# Patient Record
Sex: MALE | Race: WHITE | NOT HISPANIC OR LATINO | Employment: STUDENT | URBAN - METROPOLITAN AREA
[De-identification: names, ages, dates, MRNs, and addresses within clinical notes are randomized per-mention and may not be internally consistent; named-entity substitution may affect disease eponyms.]

---

## 2019-01-24 ENCOUNTER — IMMUNIZATION (OUTPATIENT)
Dept: PHARMACY | Facility: CLINIC | Age: 29
End: 2019-01-24
Payer: COMMERCIAL

## 2020-04-21 DIAGNOSIS — Z01.84 ANTIBODY RESPONSE EXAMINATION: ICD-10-CM

## 2020-05-02 ENCOUNTER — HOSPITAL ENCOUNTER (EMERGENCY)
Facility: HOSPITAL | Age: 30
Discharge: HOME | End: 2020-05-02
Attending: PEDIATRICS
Payer: COMMERCIAL

## 2020-05-02 VITALS
DIASTOLIC BLOOD PRESSURE: 94 MMHG | HEART RATE: 83 BPM | SYSTOLIC BLOOD PRESSURE: 153 MMHG | WEIGHT: 155 LBS | RESPIRATION RATE: 16 BRPM | OXYGEN SATURATION: 100 % | TEMPERATURE: 99 F | HEIGHT: 66 IN | BODY MASS INDEX: 24.91 KG/M2

## 2020-05-02 DIAGNOSIS — S90.551A: Primary | ICD-10-CM

## 2020-05-02 PROCEDURE — 99283 EMERGENCY DEPT VISIT LOW MDM: CPT

## 2020-05-02 PROCEDURE — 27610 EXPLORE/TREAT ANKLE JOINT: CPT | Mod: RT

## 2020-05-02 PROCEDURE — 99283 EMERGENCY DEPT VISIT LOW MDM: CPT | Mod: ,,, | Performed by: PEDIATRICS

## 2020-05-02 PROCEDURE — 99283 PR EMERGENCY DEPT VISIT,LEVEL III: ICD-10-PCS | Mod: ,,, | Performed by: PEDIATRICS

## 2020-05-02 PROCEDURE — 25000003 PHARM REV CODE 250: Performed by: PEDIATRICS

## 2020-05-02 RX ORDER — LIDOCAINE HYDROCHLORIDE AND EPINEPHRINE 10; 10 MG/ML; UG/ML
10 INJECTION, SOLUTION INFILTRATION; PERINEURAL ONCE
Status: COMPLETED | OUTPATIENT
Start: 2020-05-02 | End: 2020-05-02

## 2020-05-02 RX ORDER — DOXYCYCLINE 100 MG/1
100 CAPSULE ORAL 2 TIMES DAILY
Qty: 20 CAPSULE | Refills: 0 | Status: SHIPPED | OUTPATIENT
Start: 2020-05-02 | End: 2020-05-12

## 2020-05-02 RX ORDER — BACITRACIN ZINC 500 UNIT/G
1 OINTMENT IN PACKET (EA) TOPICAL
Status: COMPLETED | OUTPATIENT
Start: 2020-05-02 | End: 2020-05-02

## 2020-05-02 RX ADMIN — LIDOCAINE HYDROCHLORIDE AND EPINEPHRINE 10 ML: 10; 10 INJECTION, SOLUTION INFILTRATION; PERINEURAL at 09:05

## 2020-05-02 RX ADMIN — BACITRACIN 1 EACH: 500 OINTMENT TOPICAL at 10:05

## 2020-05-03 NOTE — ED PROVIDER NOTES
Encounter Date: 5/2/2020       History     Chief Complaint   Patient presents with    Ankle Pain     Fishing hook to right medial ankle since 1600 today. No bleeding currently.      29-year-old male was deep sea fishing today for 2 none when he got a very large look impaled in his right ankle.  This occurred around 4:00 p.m..  Upon returning he cleaned his fish and then came to the emergency room.  He denies any numbness or tingling.  There is no bleeding.  The hook remains imbedded in his ankle.  He is otherwise well.  No fever, cough or cold symptoms, nausea, vomiting, or diarrhea.  No sore throat.    ILLNESS: none, ALLERGIES: none, SURGERIES: none, HOSPITALIZATIONS: none, MEDICATIONS: none, Immunizations: UTD, last tetanus in January of 2019.      The history is provided by the patient.     Review of patient's allergies indicates:  No Known Allergies  History reviewed. No pertinent past medical history.  History reviewed. No pertinent surgical history.  History reviewed. No pertinent family history.  Social History     Tobacco Use    Smoking status: Never Smoker    Smokeless tobacco: Never Used   Substance Use Topics    Alcohol use: Not on file    Drug use: Not on file     Review of Systems   Constitutional: Negative for fever.   HENT: Negative for congestion, rhinorrhea and sore throat.    Eyes: Negative for discharge.   Respiratory: Negative for cough.    Gastrointestinal: Negative for diarrhea, nausea and vomiting.   Genitourinary: Negative for decreased urine volume.   Musculoskeletal: Positive for gait problem.   Skin: Positive for wound. Negative for rash.   Allergic/Immunologic: Negative for immunocompromised state.   Hematological: Does not bruise/bleed easily.       Physical Exam     Initial Vitals [05/02/20 2058]   BP Pulse Resp Temp SpO2   (!) 166/82 83 16 98.8 °F (37.1 °C) 100 %      MAP       --         Physical Exam    Nursing note and vitals reviewed.  Constitutional: He appears well-developed  and well-nourished. No distress.   Pulmonary/Chest: No respiratory distress.   Musculoskeletal:        Feet:          ED Course   Foreign Body  Date/Time: 5/2/2020 9:37 PM  Performed by: Patel Hou MD  Authorized by: Patel Hou MD   Consent Done: Not Needed  Body area: skin  General location: lower extremity  Location details: right ankle  Anesthesia: local infiltration    Anesthesia:  Local Anesthetic: lidocaine 1% with epinephrine  Anesthetic total: 4 mL  Patient sedated: no  Patient restrained: no  Complexity: simple  1 objects recovered.  Objects recovered: [place]  Post-procedure assessment: foreign body removed  Patient tolerance: Patient tolerated the procedure well with no immediate complications  Comments: Ratchet pliers used to cut non barbed end of the hook off. hook gripped and pushed through creating 2nd puncture wound.  End of look grabbed and pulled through without complication.  No active bleeding after.  Puncture wounds irrigated with over 400 mL of normal saline.      Labs Reviewed - No data to display       Imaging Results    None          Medical Decision Making:   History:   I obtained history from: someone other than patient.  Old Medical Records: I decided to obtain old medical records.  Initial Assessment:   3-year-old male with large fish hook imbedded in his ankle  Differential Diagnosis:   Puncture wound  Vascular injury  Nerve injury  Tendon or ligament injury  ED Management:  Spoke is mostly superficial.  Site numbed with lidocaine and fishhook removed.  See procedure note for details.                                 Clinical Impression:       ICD-10-CM ICD-9-CM   1. Foreign body of skin of ankle, right, initial encounter S90.551A 916.6         Disposition:   Disposition: Discharged  Condition: Stable  [place] imbedded ankle.  Successfully removed.  No complications.  Doxycycline twice a day for 10 days.  Return for signs of infection     ED Disposition Condition     Discharge Good        ED Prescriptions     Medication Sig Dispense Start Date End Date Auth. Provider    doxycycline (VIBRAMYCIN) 100 MG Cap Take 1 capsule (100 mg total) by mouth 2 (two) times daily. for 10 days 20 capsule 5/2/2020 5/12/2020 Patel Hou MD        Follow-up Information     Follow up With Specialties Details Why Contact Info    Primary Doctor No  Schedule an appointment as soon as possible for a visit  As needed, If symptoms worsen                                      Patel Hou MD  05/02/20 [id number]

## 2020-05-03 NOTE — DISCHARGE INSTRUCTIONS
Soak wound in warm water for 20 min 2 to 3 times a day to help prevent infection.  Apply antibiotic ointment and bandage afterwards.

## 2020-05-03 NOTE — ED TRIAGE NOTES
Pt. Presents with a fishing hook to right, medial ankle. The pt. States that the accident happened at about 1600 this afternoon. The pt. States that he took 2 Aleve at about 1800 this evening for the pain.

## 2020-05-21 DIAGNOSIS — Z01.84 ANTIBODY RESPONSE EXAMINATION: ICD-10-CM

## 2020-06-20 DIAGNOSIS — Z01.84 ANTIBODY RESPONSE EXAMINATION: ICD-10-CM

## 2020-07-20 DIAGNOSIS — Z01.84 ANTIBODY RESPONSE EXAMINATION: ICD-10-CM

## 2020-08-19 DIAGNOSIS — Z01.84 ANTIBODY RESPONSE EXAMINATION: ICD-10-CM

## 2020-09-18 DIAGNOSIS — Z01.84 ANTIBODY RESPONSE EXAMINATION: ICD-10-CM

## 2020-10-18 DIAGNOSIS — Z01.84 ANTIBODY RESPONSE EXAMINATION: ICD-10-CM

## 2020-11-17 DIAGNOSIS — Z01.84 ANTIBODY RESPONSE EXAMINATION: ICD-10-CM

## 2021-01-19 ENCOUNTER — IMMUNIZATION (OUTPATIENT)
Dept: INTERNAL MEDICINE | Facility: CLINIC | Age: 31
End: 2021-01-19
Payer: COMMERCIAL

## 2021-01-19 DIAGNOSIS — Z23 NEED FOR VACCINATION: Primary | ICD-10-CM

## 2021-01-19 PROCEDURE — 91300 COVID-19, MRNA, LNP-S, PF, 30 MCG/0.3 ML DOSE VACCINE: CPT | Mod: PBBFAC | Performed by: INTERNAL MEDICINE

## 2021-02-09 ENCOUNTER — IMMUNIZATION (OUTPATIENT)
Dept: INTERNAL MEDICINE | Facility: CLINIC | Age: 31
End: 2021-02-09
Payer: COMMERCIAL

## 2021-02-09 DIAGNOSIS — Z23 NEED FOR VACCINATION: Primary | ICD-10-CM

## 2021-02-09 PROCEDURE — 91300 COVID-19, MRNA, LNP-S, PF, 30 MCG/0.3 ML DOSE VACCINE: CPT | Mod: PBBFAC | Performed by: INTERNAL MEDICINE

## 2021-02-09 PROCEDURE — 0002A COVID-19, MRNA, LNP-S, PF, 30 MCG/0.3 ML DOSE VACCINE: CPT | Mod: PBBFAC | Performed by: INTERNAL MEDICINE

## 2023-10-17 ENCOUNTER — LAB VISIT (OUTPATIENT)
Dept: LAB | Facility: HOSPITAL | Age: 33
End: 2023-10-17
Payer: COMMERCIAL

## 2023-10-17 ENCOUNTER — OFFICE VISIT (OUTPATIENT)
Dept: PRIMARY CARE CLINIC | Facility: CLINIC | Age: 33
End: 2023-10-17
Payer: COMMERCIAL

## 2023-10-17 VITALS
SYSTOLIC BLOOD PRESSURE: 130 MMHG | HEART RATE: 64 BPM | WEIGHT: 171.31 LBS | OXYGEN SATURATION: 99 % | TEMPERATURE: 97 F | HEIGHT: 66 IN | DIASTOLIC BLOOD PRESSURE: 82 MMHG | BODY MASS INDEX: 27.53 KG/M2

## 2023-10-17 DIAGNOSIS — Z11.59 ENCOUNTER FOR HCV SCREENING TEST FOR LOW RISK PATIENT: ICD-10-CM

## 2023-10-17 DIAGNOSIS — Z13.220 ENCOUNTER FOR LIPID SCREENING FOR CARDIOVASCULAR DISEASE: ICD-10-CM

## 2023-10-17 DIAGNOSIS — Z13.1 ENCOUNTER FOR SCREENING FOR DIABETES MELLITUS: ICD-10-CM

## 2023-10-17 DIAGNOSIS — Z13.29 THYROID DISORDER SCREENING: ICD-10-CM

## 2023-10-17 DIAGNOSIS — Z84.81 FAMILY HISTORY OF BRCA GENE POSITIVE: ICD-10-CM

## 2023-10-17 DIAGNOSIS — L40.9 PSORIASIS: ICD-10-CM

## 2023-10-17 DIAGNOSIS — Z11.4 ENCOUNTER FOR SCREENING FOR HIV: ICD-10-CM

## 2023-10-17 DIAGNOSIS — Z76.89 ENCOUNTER TO ESTABLISH CARE: ICD-10-CM

## 2023-10-17 DIAGNOSIS — Z13.6 ENCOUNTER FOR LIPID SCREENING FOR CARDIOVASCULAR DISEASE: ICD-10-CM

## 2023-10-17 DIAGNOSIS — Z00.00 ANNUAL PHYSICAL EXAM: ICD-10-CM

## 2023-10-17 DIAGNOSIS — Z00.00 ANNUAL PHYSICAL EXAM: Primary | ICD-10-CM

## 2023-10-17 LAB
ALBUMIN SERPL BCP-MCNC: 4.5 G/DL (ref 3.5–5.2)
ALP SERPL-CCNC: 54 U/L (ref 55–135)
ALT SERPL W/O P-5'-P-CCNC: 28 U/L (ref 10–44)
ANION GAP SERPL CALC-SCNC: 10 MMOL/L (ref 8–16)
AST SERPL-CCNC: 27 U/L (ref 10–40)
BASOPHILS # BLD AUTO: 0.07 K/UL (ref 0–0.2)
BASOPHILS NFR BLD: 1.2 % (ref 0–1.9)
BILIRUB SERPL-MCNC: 0.8 MG/DL (ref 0.1–1)
BUN SERPL-MCNC: 12 MG/DL (ref 6–20)
CALCIUM SERPL-MCNC: 10.3 MG/DL (ref 8.7–10.5)
CHLORIDE SERPL-SCNC: 104 MMOL/L (ref 95–110)
CHOLEST SERPL-MCNC: 229 MG/DL (ref 120–199)
CHOLEST/HDLC SERPL: 4.6 {RATIO} (ref 2–5)
CO2 SERPL-SCNC: 27 MMOL/L (ref 23–29)
CREAT SERPL-MCNC: 1.1 MG/DL (ref 0.5–1.4)
DIFFERENTIAL METHOD: NORMAL
EOSINOPHIL # BLD AUTO: 0.4 K/UL (ref 0–0.5)
EOSINOPHIL NFR BLD: 6.7 % (ref 0–8)
ERYTHROCYTE [DISTWIDTH] IN BLOOD BY AUTOMATED COUNT: 12.6 % (ref 11.5–14.5)
EST. GFR  (NO RACE VARIABLE): >60 ML/MIN/1.73 M^2
ESTIMATED AVG GLUCOSE: 94 MG/DL (ref 68–131)
GLUCOSE SERPL-MCNC: 84 MG/DL (ref 70–110)
HBA1C MFR BLD: 4.9 % (ref 4–5.6)
HCT VFR BLD AUTO: 49 % (ref 40–54)
HCV AB SERPL QL IA: NORMAL
HDLC SERPL-MCNC: 50 MG/DL (ref 40–75)
HDLC SERPL: 21.8 % (ref 20–50)
HGB BLD-MCNC: 16.4 G/DL (ref 14–18)
HIV 1+2 AB+HIV1 P24 AG SERPL QL IA: NORMAL
IMM GRANULOCYTES # BLD AUTO: 0.02 K/UL (ref 0–0.04)
IMM GRANULOCYTES NFR BLD AUTO: 0.4 % (ref 0–0.5)
LDLC SERPL CALC-MCNC: 160.2 MG/DL (ref 63–159)
LYMPHOCYTES # BLD AUTO: 2.1 K/UL (ref 1–4.8)
LYMPHOCYTES NFR BLD: 37.7 % (ref 18–48)
MCH RBC QN AUTO: 29.8 PG (ref 27–31)
MCHC RBC AUTO-ENTMCNC: 33.5 G/DL (ref 32–36)
MCV RBC AUTO: 89 FL (ref 82–98)
MONOCYTES # BLD AUTO: 0.5 K/UL (ref 0.3–1)
MONOCYTES NFR BLD: 9.2 % (ref 4–15)
NEUTROPHILS # BLD AUTO: 2.5 K/UL (ref 1.8–7.7)
NEUTROPHILS NFR BLD: 44.8 % (ref 38–73)
NONHDLC SERPL-MCNC: 179 MG/DL
NRBC BLD-RTO: 0 /100 WBC
PLATELET # BLD AUTO: 304 K/UL (ref 150–450)
PMV BLD AUTO: 9.9 FL (ref 9.2–12.9)
POTASSIUM SERPL-SCNC: 4.3 MMOL/L (ref 3.5–5.1)
PROT SERPL-MCNC: 7.4 G/DL (ref 6–8.4)
RBC # BLD AUTO: 5.51 M/UL (ref 4.6–6.2)
SODIUM SERPL-SCNC: 141 MMOL/L (ref 136–145)
TRIGL SERPL-MCNC: 94 MG/DL (ref 30–150)
TSH SERPL DL<=0.005 MIU/L-ACNC: 1.13 UIU/ML (ref 0.4–4)
WBC # BLD AUTO: 5.63 K/UL (ref 3.9–12.7)

## 2023-10-17 PROCEDURE — 3008F PR BODY MASS INDEX (BMI) DOCUMENTED: ICD-10-PCS | Mod: CPTII,S$GLB,, | Performed by: FAMILY MEDICINE

## 2023-10-17 PROCEDURE — 3079F DIAST BP 80-89 MM HG: CPT | Mod: CPTII,S$GLB,, | Performed by: FAMILY MEDICINE

## 2023-10-17 PROCEDURE — 36415 COLL VENOUS BLD VENIPUNCTURE: CPT | Mod: PN | Performed by: FAMILY MEDICINE

## 2023-10-17 PROCEDURE — 87389 HIV-1 AG W/HIV-1&-2 AB AG IA: CPT | Performed by: FAMILY MEDICINE

## 2023-10-17 PROCEDURE — 1159F PR MEDICATION LIST DOCUMENTED IN MEDICAL RECORD: ICD-10-PCS | Mod: CPTII,S$GLB,, | Performed by: FAMILY MEDICINE

## 2023-10-17 PROCEDURE — 80053 COMPREHEN METABOLIC PANEL: CPT | Performed by: FAMILY MEDICINE

## 2023-10-17 PROCEDURE — 3079F PR MOST RECENT DIASTOLIC BLOOD PRESSURE 80-89 MM HG: ICD-10-PCS | Mod: CPTII,S$GLB,, | Performed by: FAMILY MEDICINE

## 2023-10-17 PROCEDURE — 3075F PR MOST RECENT SYSTOLIC BLOOD PRESS GE 130-139MM HG: ICD-10-PCS | Mod: CPTII,S$GLB,, | Performed by: FAMILY MEDICINE

## 2023-10-17 PROCEDURE — 83036 HEMOGLOBIN GLYCOSYLATED A1C: CPT | Performed by: FAMILY MEDICINE

## 2023-10-17 PROCEDURE — 80061 LIPID PANEL: CPT | Performed by: FAMILY MEDICINE

## 2023-10-17 PROCEDURE — 99385 PR PREVENTIVE VISIT,NEW,18-39: ICD-10-PCS | Mod: S$GLB,,, | Performed by: FAMILY MEDICINE

## 2023-10-17 PROCEDURE — 99385 PREV VISIT NEW AGE 18-39: CPT | Mod: S$GLB,,, | Performed by: FAMILY MEDICINE

## 2023-10-17 PROCEDURE — 3075F SYST BP GE 130 - 139MM HG: CPT | Mod: CPTII,S$GLB,, | Performed by: FAMILY MEDICINE

## 2023-10-17 PROCEDURE — 1159F MED LIST DOCD IN RCRD: CPT | Mod: CPTII,S$GLB,, | Performed by: FAMILY MEDICINE

## 2023-10-17 PROCEDURE — 99999 PR PBB SHADOW E&M-EST. PATIENT-LVL IV: CPT | Mod: PBBFAC,,, | Performed by: FAMILY MEDICINE

## 2023-10-17 PROCEDURE — 99999 PR PBB SHADOW E&M-EST. PATIENT-LVL IV: ICD-10-PCS | Mod: PBBFAC,,, | Performed by: FAMILY MEDICINE

## 2023-10-17 PROCEDURE — 86803 HEPATITIS C AB TEST: CPT | Performed by: FAMILY MEDICINE

## 2023-10-17 PROCEDURE — 3008F BODY MASS INDEX DOCD: CPT | Mod: CPTII,S$GLB,, | Performed by: FAMILY MEDICINE

## 2023-10-17 PROCEDURE — 85025 COMPLETE CBC W/AUTO DIFF WBC: CPT | Performed by: FAMILY MEDICINE

## 2023-10-17 PROCEDURE — 84443 ASSAY THYROID STIM HORMONE: CPT | Performed by: FAMILY MEDICINE

## 2023-10-17 RX ORDER — RUXOLITINIB 15 MG/G
CREAM TOPICAL
COMMUNITY
Start: 2023-07-14

## 2023-10-17 NOTE — PATIENT INSTRUCTIONS
Physically, everything looks really good today.      Let us get some screening blood work done today.  Results will be posted to [facility] as soon as they are available.      With your family history of the gene mutation, let us have you visit with our Genetics Department.  They will discuss screening options.  Referral placed today.  If you do not hear from them in the next week or so, please let me know.  I will see what I can do to facilitate the appointment.      Continue to eat a healthy diet.  Be careful with portion sizes.  Includes lots of fresh fruits, vegetables, whole grains, lean proteins.  See info below.    Keep hydrated.  Be sure to drink at least 8-10, 8 oz, glasses of water every day.    Stay active.  Try to do some sort of physical activity every day.  Nothing outrageous, just try walking for 10-15 minutes each day.

## 2023-10-17 NOTE — PROGRESS NOTES
[facility]       [address] [place] [address]      Phone: [phone]      Fax: [phone]    Benedicto Foster MD    Office Visit  10/17/2023    Establish Care      33-year-old gentleman presents today to establish care, checkup.      Patient states he moved from [place].  Needs to get established with someone here.    Overall, feels okay.  No acute complaints.  No chest pain, shortness a breath.  No fever, chills, body aches.  No coughing, sneezing, URI type symptoms.  Appetite normal.  Bowel movements normal.  No urinary issues.      Has history of psoriasis on his hands.  Sees Dermatology regularly.  They started him on medication,opzelura.  So far, seems to be working.      States that a gene mutation runs in the family.  Recently, his maternal grandmother passed away suddenly from ovarian cancer.  As a result, his mom got tested for the BRCA gene, she was positive.  She had a prophylactic double mastectomy.  His sister was tested, as well, but she came back negative.  He would like to discuss getting tested himself.      PMH:  Psoriasis   PSH: None reported  F MH:  CAD/CABG.  HTN.  Ovarian cancer.  BRCA gene mutation.  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    T: Denies  A:  Socially/occasionally   D: Denies    Exercise rides exercise bike, runs, a couple times per week.      Dermatology:  Mikki Sutherland at [facility] ([facility])            Immunizations:  Immunization History   Administered Date(s) Administered    COVID-19, MRNA, LN-S, PF (Pfizer) (Purple Cap) 2021, 2021, 2021    DTaP 1991, 1992, 1992, 1993, 10/03/1995    HIB 1991, 1992, 1993    HPV Quadrivalent 2013    Hepatitis A, Pediatric/Adolescent, 2 Dose 2007    Hepatitis B, Pediatric/Adolescent 08/10/2004, 09/10/2004, 02/10/2005    IPV 1991,  "03/03/1992, 03/03/1993, 09/03/1994, 10/07/1995    Influenza - Quadrivalent - PF *Preferred* (6 months and older) 10/05/2016, 10/12/2017, 10/04/2018, 10/02/2019, 10/15/2020    Influenza - Trivalent (ADULT) 10/07/2014    MMR 01/03/1993, 07/03/1995, 07/31/2012    Meningococcal Conjugate 07/05/2012    Meningococcal Conjugate (MCV4P) 08/14/2007, 07/31/2012    PPD Test 06/18/2012, 07/23/2013    Td (ADULT) 08/10/2004    Tdap 07/15/2014, 01/24/2019    Varicella 09/03/2001, 08/14/2007, 07/05/2012, 08/20/2012    meningococcal Conjugate (MCV4O) 07/05/2012       Care Teams:  Patient Care Team:  Benedicto Foster MD as PCP - General (Family Medicine)    Medical History:  History reviewed. No pertinent past medical history.    Social History:  Social History     Socioeconomic History    Marital status: Single   Tobacco Use    Smoking status: Never    Smokeless tobacco: Never       Alcohol History:  Social History     Substance and Sexual Activity   Alcohol Use None       Tobacco History:  Social History     Tobacco Use   Smoking Status Never   Smokeless Tobacco Never       Family History:  Family History   Problem Relation Age of Onset    Hypertension Father        Surgical History:  History reviewed. No pertinent surgical history.    Allergies:  Review of patient's allergies indicates:   Allergen Reactions    Allergen ext-venom-honey bee Swelling    Wasp venom Hives, Itching and Swelling       Medications:    Current Outpatient Medications:     OPZELURA 1.5 % Crea, Apply topically., Disp: , Rfl:     Health Maintenance:  Health Maintenance   Topic Date Due    Hepatitis C Screening  Never done    TETANUS VACCINE  01/24/2029    Lipid Panel  Completed       Screening Questions  1.  Do you smoke? No  2.  In the past month have you been bothered by feeling "down", depressed or hopeless? No  3.  In the past month, have you experienced a loss of interest or pleasure in doing things? No  4.  In the past 3 months, on any single " "occasion have you had 5 or more drinks containing alcohol? No  5.  Regarding your use of alcohol, have you ever felt you should cut down on your drinking? No  6.  Are you sexually active? Yes  7   Do you exercise?  three times a week    Counseling  The patient was counseled regarding diet and exercise, motor vehicle safety, sun exposure, and use of vitamins and supplements.  The patient was counseled regarding Living Will/Durable Power-Of-.  The patient was given information regarding the dangers of smoking and the overuse of alcohol.    Review of Systems   Constitutional:  Negative for activity change, appetite change, chills and fever.   HENT:  Negative for congestion, postnasal drip, rhinorrhea, sore throat and trouble swallowing.    Respiratory:  Negative for cough and shortness of breath.    Cardiovascular:  Negative for chest pain and palpitations.   Gastrointestinal:  Negative for abdominal pain, constipation, diarrhea, nausea and vomiting.   Genitourinary:  Negative for difficulty urinating.   Musculoskeletal:  Negative for arthralgias and myalgias.   Skin:  Negative for color change and rash.   Neurological:  Negative for headaches.   All other systems reviewed and are negative.       Objective   Vitals:    10/17/23 1114   BP: 130/82   Pulse: 64   Temp: 97.4 °F (36.3 °C)   SpO2: 99%   Weight: 77.7 kg (171 lb 4.8 oz)   Height: 5' 6" (1.676 m)     Physical Exam  Vitals and nursing note reviewed.   Constitutional:       General: He is not in acute distress.     Appearance: Normal appearance.   HENT:      Head: Normocephalic and atraumatic.      Right Ear: Tympanic membrane, ear canal and external ear normal.      Left Ear: Tympanic membrane, ear canal and external ear normal.      Nose: Nose normal. No congestion or rhinorrhea.      Mouth/Throat:      Mouth: Mucous membranes are moist.      Pharynx: Oropharynx is clear. No oropharyngeal exudate or posterior oropharyngeal erythema.   Eyes:      " Extraocular Movements: Extraocular movements intact.      Conjunctiva/sclera: Conjunctivae normal.      Pupils: Pupils are equal, round, and reactive to light.   Cardiovascular:      Rate and Rhythm: Normal rate and regular rhythm.   Pulmonary:      Effort: Pulmonary effort is normal.      Breath sounds: No wheezing, rhonchi or rales.   Musculoskeletal:         General: Normal range of motion.      Cervical back: Normal range of motion.   Lymphadenopathy:      Cervical: No cervical adenopathy.   Skin:     General: Skin is warm and dry.   Neurological:      General: No focal deficit present.      Mental Status: He is alert.          No results found for this or any previous visit (from the past 4368 hour(s)).    Alexei Aguiar was seen today for establish care.    Diagnoses and all orders for this visit:    Annual physical exam  -     CBC Auto Differential; Future  -     Comprehensive Metabolic Panel; Future  -     TSH; Future  -     Lipid Panel; Future  -     Hemoglobin A1C; Future  -     HIV 1/2 Ag/Ab (4th Gen); Future  -     Hepatitis C Antibody; Future    Encounter to establish care    Psoriasis    Family history of BRCA gene positive  -     Ambulatory referral/consult to Genetics; Future    Thyroid disorder screening  -     TSH; Future    Encounter for lipid screening for cardiovascular disease  -     Lipid Panel; Future    Encounter for screening for diabetes mellitus  -     Hemoglobin A1C; Future    Encounter for screening for HIV  -     HIV 1/2 Ag/Ab (4th Gen); Future    Encounter for HCV screening test for low risk patient  -     Hepatitis C Antibody; Future    Screening labs, as above.      Referral to Genetics to discuss BRCA screening.    Follow-up:  Follow up in about 1 year (around 10/17/2024) for annual exam.    Signed by:  Benedicto Foster MD

## 2023-10-21 ENCOUNTER — PATIENT MESSAGE (OUTPATIENT)
Dept: PRIMARY CARE CLINIC | Facility: CLINIC | Age: 33
End: 2023-10-21
Payer: COMMERCIAL

## 2023-10-21 DIAGNOSIS — E78.01 FAMILIAL HYPERCHOLESTEROLEMIA: Primary | ICD-10-CM

## 2023-10-21 NOTE — PROGRESS NOTES
Overall, blood work looks pretty good.      Blood counts were all normal.  Electrolytes, kidney function, liver function, and thyroid function are fine.  You were negative for HIV and hepatitis-C.  A1c was in the normal range, so no signs of diabetes.    Cholesterol was okay.  Total cholesterol was 229.  Ideally, we would like this under 200.  LDL, or bad cholesterol, was also elevated at 160.  We really like this closer to 100.  That said, triglycerides were good at 94.  Anything under 150 is normal.  Also, HDL, or good cholesterol, was fine at 50.  Anything over 40 is normal.    At your age, I really do not want to put you on cholesterol medicine.  Really try to focus on diet and exercise.  Be careful with portions sizes.  Includes lots of fresh vegetables, whole grains, lean proteins.  Drink lots of water every day.  Stay active.  Do some sort of physical activity every day.  If these numbers continue to rise, we may have to consider some cholesterol medicine to help lower your risk of developing heart disease.  Let us try diet and exercise 1st.

## 2023-10-23 RX ORDER — ROSUVASTATIN CALCIUM 5 MG/1
5 TABLET, COATED ORAL DAILY
Qty: 90 TABLET | Refills: 3 | Status: SHIPPED | OUTPATIENT
Start: 2023-10-23 | End: 2024-10-22

## 2023-10-30 ENCOUNTER — PATIENT MESSAGE (OUTPATIENT)
Dept: GENETICS | Facility: CLINIC | Age: 33
End: 2023-10-30
Payer: COMMERCIAL

## 2023-11-08 NOTE — TELEPHONE ENCOUNTER
Spoke with Mr. Fontenotente via phone and scheduled his New Patient appointment with Josephine Martinez MS for 11.28.23 as requested.

## 2023-11-27 NOTE — PROGRESS NOTES
"  Cancer Genetics  Hereditary/High Risk Clinic  Department of Hematology and Oncology  [facility]        Date of Service:  2023  Provider:  Josephine Sanford MS, [facility]    Patient Information  Name:  Cosme Thompson  :  1990  MRN:  [id number]        Referring Provider: Benedicto Foster MD     The chief complaint leading to consultation is: as below.  Visit type: in-person.  Face-to-face time with patient: approximately 30 minutes.  Approximately 70 minutes in total were spent on this encounter, which includes face-to-face time and non-face-to-face time preparing to see the patient (e.g., review of tests), obtaining and/or reviewing separately obtained history, documenting clinical information in the electronic or other health record, independently interpreting results (not   reported) and communicating results to the patient/family/caregiver, or care coordination (not separately reported).      SUBJECTIVE:      Chief Complaint: Genetic Counseling    History of Present Illness (HPI):  Cosme Thompson ("Cosme"), 33 y.o., assigned male sex at birth is new to the [facility] and to me.  He was referred by  Primary Care  for genetic cancer risk assessment given his family history of BRCA2 mutation. He has no personal history of cancer.     Focused Medical History:   Germline cancer-genetic testing:  No  Cancer:  No  Colonoscopy: No  Other benign tumor:  Yes  Precancerous mole on back (2023)  Pancreatitis:  No  Pancreatic cyst:  No    Focused Surgical History  N/A    Ancestry:  Ashkenazi Anglican ancestry:  No  Paternal: /Burmese  Maternal: Sarika, Kiswahili    Focused Family History:  Consanguinity in ancestors:  No  Germline cancer-genetic testing in blood relatives:  Yes - Mother and Sister   Mother - [employer] Hereditary Cancer Test (2023)  BRCA2 [id number] ([id number]*) - PATHOGENIC  Sister - BRCA2 negative  Cancer in family:  Yes; there are no known blood " relatives affected with cancer other than those mentioned in the pedigree below    Family Cancer Pedigree:         Review of Systems:  See HPI.        SUBJECTIVE:   Records Reviewed  Medical History  Problem List  Any pertinent, available Procedures and Pathology reports in both [facility] and [facility]    COUNSELING   Causes of cancer  Germline cancer genetic testing is the testing of genes associated with cancer, known as cancer susceptibility genes.  Just as these genes are inherited from parents, mutations in these genes can be inherited, as well.  A mutation in a cancer susceptibility gene adversely affects the gene's ability to prevent cancer; therefore, carriers of cancer susceptibility gene mutations may be at increased risk for certain cancers.     Only 5-10% cancers are caused by a cancer susceptibility gene mutation, meaning the cancer is genetic/hereditary; rather, most cancers are sporadic.  Causes of sporadic cancers may include environmental risk factors, lifestyle risk factors, and non-modifiable risk factors.  It is important to note that members of a family often share not only their genetics but also risk factors including environmental and lifestyle risk factors, so cancers can be familial.    Mutations in BRCA1 and BRCA2 are associated with an increased risk for breast and ovarian cancer, in addition to male breast cancer, pancreatic, melanoma, and prostate cancer. Mutations in other genes may increase the risk of breast and prostate cancer, in addition to other cancers.     Potential results of genetic testing, and their implications  Potential results of genetic testing include positive, negative, and variant of unknown significance (VUS).    A positive result indicates the presence of at least one clinically significant mutation, and the patient's associated cancer risks vary depending upon the cancer susceptibility gene(s) in which there is/are a mutation(s).  With a  positive result, in some cases, depending upon the specific result and the patient's clinical history, modified risk management may be recommended, including measures for risk reduction and/or surveillance; however, modified management is not always an option.    A negative result indicates that no clinically significant mutations were identified in the gene(s) tested.    A VUS indicates that there is not presently enough data for the laboratory to make a determination as to whether the variant is clinically significant.  VUSs are not typically acted upon clinically.       The ability to interpret the meaning of a negative genetic testing result in genes associated with cancer with which the patient has not personally been affected, when done prior to testing the appropriate affected relative(s), is significantly limited.  A negative result in the patient does not indicate that she cannot develop cancer, and, in fact, the patient may even be at increased risk for cancer based on shared risk factors with affected relatives.  The most informative candidates for initial genetic testing in a family are those who have been affected with cancer.     Modified management may also be recommended, even with a result of no or unknown significance, based upon risk assessment that incorporates the family history.      Genetic mutation inheritance  If  Cosme tests positive for a mutation, her first-degree relatives would each have a 50% chance of having the same mutation, and other, more distantly related blood-relatives would also be at risk of having the same mutation.       Genetic discrimination  The Genetic Information Nondiscrimination Act (KAVYA) is U[place]S. federal legislation that provides some protections against use of an individual's genetic information by their health insurer and by their employer.  Title I of KAVYA prohibits most health insurers from utilizing an individual's genetic information to make decisions  regarding insurance eligibility or premium charges.  Title II of KAVYA prohibits covered entities, including employers, from requesting the genetic information of employees and applicants.  KAVYA does not protect individuals from genetic discrimination toward health insurance obtained through a job with the  or through the Federal Employees Health Benefits Plan; from genetic discrimination by employers with fewer than 15 employees or if employed by the ; or from genetic discrimination by any other type of policies/entities, including but not limited to life insurance, disability insurance, long-term care insurance,  benefits, and [facility] benefits.     Genetic testing logistics  An outside laboratory would perform the testing after a blood sample is collected at [facility] or a saliva sample is collected by the patient at home.  With genetic testing, there is a potential for the patient to incur out-of-pocket costs.  Results can take 2-3 weeks.  Post-test genetic counseling can be conducted once the genetic testing results are available.     Assessment/Plan  Based on the information provided by  Cosme, he meets current NCCN criteria for genetic testing of hereditary breast and ovarian cancer given the family history of a BRCA2 mutation identified in his mother.     Offered germline cancer-genetic testing at this time versus deferring testing at this time or declining testing altogether.  Various test panel options were discussed. Cosme decided to proceed with genetic testing through the 2-gene BRCA1/BRCA2 Core Panel and the 70-gene Multi-Cancer Panel (AIP, ALK, APC, VENKATA, AXIN2, BAP1, BARD1, BLM, BMPR1A, BRCA1, BRCA2, BRIP1, CDC73, CDH1, CDK4, CDKN1B, CDKN2A, CHEK2, CTNNA1, DICER1, EGFR, EPCAM, FH, FLCN, GREM1, HOXB13, KIT, LZTR1, MAX, MBD4, MEN1, MET, MITF, MLH1, MSH2, MSH3, MSH6, MUTYH, NF1, NF2, NTHL1, PALB2, PDGFRA, PMS2, POLD1, POLE, POT1, VTZSC2V, PTCH1, PTEN, RAD51C, RAD51D,  RB1, RET, SDHA, SDHAF2, SDHB, SDHC, SDHD, SMAD4, SMARCA4, SMARCB1, SMARCE1, STK11, SUFU, NSUS426, TP53, TSC1, TSC2, VHL), with note to look at his mother's mutation: BRCA2 [id number] (p.[id number]*).  Cosme has provided his informed consent to proceed. A blood sample was collected today.     Questions were encouraged and answered to the patient's satisfaction, and he verbalized understanding of information and agreement with the plan.         Signed,    Josephine Sanford MS, [facility]  Certified Genetic Counselor, Hereditary and High-Risk Clinic  Hematology/Oncology, [facility]    11/28/2023

## 2023-11-28 ENCOUNTER — LAB VISIT (OUTPATIENT)
Dept: LAB | Facility: HOSPITAL | Age: 33
End: 2023-11-28
Attending: INTERNAL MEDICINE
Payer: COMMERCIAL

## 2023-11-28 ENCOUNTER — OFFICE VISIT (OUTPATIENT)
Dept: GENETICS | Facility: CLINIC | Age: 33
End: 2023-11-28
Payer: COMMERCIAL

## 2023-11-28 DIAGNOSIS — Z80.41 FAMILY HISTORY OF OVARIAN CANCER: ICD-10-CM

## 2023-11-28 DIAGNOSIS — Z84.81 FAMILY HISTORY OF BRCA GENE POSITIVE: ICD-10-CM

## 2023-11-28 DIAGNOSIS — Z80.41 FAMILY HISTORY OF OVARIAN CANCER: Primary | ICD-10-CM

## 2023-11-28 PROCEDURE — 36415 COLL VENOUS BLD VENIPUNCTURE: CPT | Performed by: INTERNAL MEDICINE

## 2023-11-28 PROCEDURE — 96040 PR GENETIC COUNSELING, EACH 30 MIN: CPT | Mod: S$GLB,,, | Performed by: INTERNAL MEDICINE

## 2023-11-28 PROCEDURE — 99999 PR PBB SHADOW E&M-EST. PATIENT-LVL I: ICD-10-PCS | Mod: PBBFAC,,,

## 2023-11-28 PROCEDURE — 99499 NO LOS: ICD-10-PCS | Mod: S$GLB,,, | Performed by: INTERNAL MEDICINE

## 2023-11-28 PROCEDURE — 96040 PR GENETIC COUNSELING, EACH 30 MIN: ICD-10-PCS | Mod: S$GLB,,, | Performed by: INTERNAL MEDICINE

## 2023-11-28 PROCEDURE — 99999 PR PBB SHADOW E&M-EST. PATIENT-LVL I: CPT | Mod: PBBFAC,,,

## 2023-11-28 PROCEDURE — 99499 UNLISTED E&M SERVICE: CPT | Mod: S$GLB,,, | Performed by: INTERNAL MEDICINE

## 2024-01-03 ENCOUNTER — TELEPHONE (OUTPATIENT)
Dept: GENETICS | Facility: CLINIC | Age: 34
End: 2024-01-03
Payer: COMMERCIAL

## 2024-01-03 NOTE — TELEPHONE ENCOUNTER
Contacted Mr. Thompson to discuss his positive genetic test results: BRCA2 mutation. This result means that he has inherited his mother's BRCA2 mutation. There were no other mutations identified in the other 69 genes. Explained that he has an increased risk for male breast, prostate, pancreatic cancer and melanoma. Also discussed that his future children have a 50% risk for this mutation, in addition to his other relatives who have an increased risk. A follow-up appointment will be scheduled to further discuss the results. He was encouraged to reach out if he has any questions. Results were released to him in the [facility] portal and will be uploaded into his medical records.

## 2024-01-17 ENCOUNTER — OFFICE VISIT (OUTPATIENT)
Dept: GENETICS | Facility: CLINIC | Age: 34
End: 2024-01-17
Payer: COMMERCIAL

## 2024-01-17 DIAGNOSIS — Z15.01 BRCA2 GENE MUTATION POSITIVE IN MALE: Primary | ICD-10-CM

## 2024-01-17 DIAGNOSIS — Z15.09 BRCA2 GENE MUTATION POSITIVE IN MALE: Primary | ICD-10-CM

## 2024-01-17 DIAGNOSIS — Z15.03 BRCA2 GENE MUTATION POSITIVE IN MALE: Primary | ICD-10-CM

## 2024-01-17 PROCEDURE — 99499 UNLISTED E&M SERVICE: CPT | Mod: 95,,, | Performed by: INTERNAL MEDICINE

## 2024-01-17 NOTE — PROGRESS NOTES
"  Cancer Genetics  Hereditary/High Risk Clinic  Department of Hematology and Oncology  [facility]        Date of Service:  2024  Provider:  Josephine Sanford MS, [facility]    Patient Information  Name:  Cosme Thompson  :  1990  MRN:  [id number]        Referring Provider: Benedicto Foster MD      Televisit Information  The patient location is: [place], [place].  The chief complaint leading to consultation is: as below.  Visit type: audiovisual.  Face-to-face time with patient: approximately 10 minutes.  Approximately 105 minutes in total were spent on this encounter, which includes face-to-face time and non-face-to-face time preparing to see the patient (e.g., review of tests), obtaining and/or reviewing separately obtained history, documenting clinical information in the electronic or other health record, independently interpreting results (not separately reported) and communicating results to the patient/family/caregiver, or care coordination (not separately reported).  Each patient to whom he or she provides medical services by telemedicine is:  (1) informed of the relationship between the physician and patient and the respective role of any other health care provider with respect to management of the patient; and (2) notified that he or she may decline to receive medical services by telemedicine and may withdraw from such care at any time.      SUBJECTIVE:      Chief Complaint: Genetic Counseling    History of Present Illness (HPI):  Cosme Thompson ("Cosme"), 33 y.o., assigned male sex at birth is returning for post-test genetic counseling.  He initially presented on 2023 after being referred by  Primary Care  for genetic cancer risk assessment given his family history of a BRCA2 mutation. He underwent genetic testing, which revealed a BRCA2 mutation. We met today to discuss the results.    Focused Medical History:   Germline cancer-genetic testing:  Yes - [facility]   BRCA2 c.[id number] " (p.[id number]*) - [facility]  Cancer:  No  Colonoscopy: No  Other benign tumor:  Yes  Precancerous mole on back (02/2023)  Pancreatitis:  No  Pancreatic cyst:  No     Focused Surgical History  N/A     Ancestry:  Ashkenazi Evangelical ancestry:  No  Paternal: /Angolan  Maternal: Sarika, Lao     Focused Family History:  Consanguinity in ancestors:  No  Germline cancer-genetic testing in blood relatives:  Yes - Mother and Sister   Mother - [facility] Hereditary Cancer Test (05/2023)  BRCA2 [id number] (p.[id number]*) - PATHOGENIC  Sister - BRCA2 negative  Cancer in family:  Yes; other than those mentioned in the pedigree below  Paternal grandfather, paternal great-uncle - prostate cancer    Family Cancer Pedigree:           Genetic Testing Report:          Review of Systems:  See HPI.      SUBJECTIVE:   Records Reviewed  Medical History  Problem List  Any pertinent, available Procedures and Pathology reports in both [facility] and [facility]    COUNSELING   Genetic Testing  Due to his family history of a BRCA2 mutation, he underwent genetic testing in November 2023. The Multi-Cancer Panel was ordered, which investigated the following 70 genes: AIP, ALK, APC, VENKATA, AXIN2, BAP1, BARD1, BLM, BMPR1A, BRCA1, BRCA2, BRIP1, CDC73, CDH1, CDK4, CDKN1B, CDKN2A, CHEK2, CTNNA1, DICER1, EGFR, EPCAM, FH, FLCN, GREM1, HOXB13, KIT, LZTR1, MAX, MBD4, MEN1, MET, MITF, MLH1, MSH2, MSH3, MSH6, MUTYH, NF1, NF2, NTHL1, PALB2, PDGFRA, PMS2, POLD1, POLE, POT1, KLCTA4Q, PTCH1, PTEN, RAD51C, RAD51D, RB1, RET, SDHA, SDHAF2, SDHB, SDHC, SDHD, SMAD4, SMARCA4, SMARCB1, SMARCE1, STK11, SUFU, UEKN532, TP53, TSC1, TSC2, VHL.     Results are POSITIVE for a heterozygous germline pathogenic mutation in the BRCA2 gene c.[id number] ([id number]*). This results means that Cosme inherited the familial BRCA2 mutation from his mother. No other pathogenic mutations were identified in the other 69 genes investigated.      BRCA2 Cancer Risks  and Guidelines  Pathogenic mutations in BRCA2 gene are associated with hereditary breast and ovarian cancer syndrome (HBOC). HBOC is caused by a mutation in one of the following 2 genes: BRCA1 and BRCA2. HBOC is the most common cause for hereditary breast and ovarian cancer. Individuals who have a mutation in one of the HBOC genes are at risk for multiple cancers. Outlined below are the associated cancer risks and management guidelines for individuals with a BRCA2 mutation.     Male-Specific Cancer Risks: There is an increased risk for male breast cancer associated with a BRCA2 gene mutation. The lifetime risk is estimated to be 7-8% (compared to the general male population risk of 0.1%). Men are recommended to practice self-breast exams regularly and have a clinical breast exam every 12 months beginning at age 35. Also, men are recommended to begin mammogram screening at age 50, or 10 years prior than the youngest male breast cancer diagnosis in the family. Men with a BRCA2 mutation are also at an increased risk for prostate cancer (lifetime risk of 19-61%) and are recommended to have annual prostate cancer screening beginning at age 40. Prostate cancer screening involves both a digital rectal exam and PSA blood testing.     Breast Cancer Risk: Women with a BRCA2 mutation have an increased risk of breast cancer. The lifetime risk is estimated to be 44-80% (compared to the general female population risk of 12%). A BRCA2 mutation can also increase the risk of a second primary breast cancer (10-30%). Because of this increased risk, women who have a BRCA2 mutation are recommended to consider a risk-reducing mastectomy. Women who have a BRCA2 mutation and have had no personal history of breast cancer may also consider following high-risk breast cancer screening. This breast cancer screening includes annual breast MRIs beginning at age 25 and annual mammograms beginning at age 30. These screenings are typically spaced 6  months apart. Self-breast exam and awareness and clinical breast exams every 6-12 months are also recommended. Some women may be offered chemoprevention medications, such as tamoxifen.      Ovarian Cancer Risk: Women with BRCA2 mutations also have an increased risk of ovarian cancer. The lifetime risk is 13-29%. Upon completion of having children, women are recommended to undergo removal of both ovaries and both fallopian tubes (bilateral salpingo-oophorectomy). This is typically recommended between the ages of 35-40, but may also be delayed to age 40-45 for childbearing. There are screening options for ovarian cancer, including a transvaginal ultrasound with a blood test measuring CA-125 levels, but these are limited with uncertain benefits.      Other Cancer Risks (for men and women): For both men and women who have a BRCA2 mutation, there is also an increased risk for pancreatic cancer (4-9%) and melanoma. Screening for these cancers can be individualized based on the family history. Pancreatic cancer screening is currently only indicated in individuals with family history of pancreatic cancer in a first- or second-degree relative on the same side of the family as the BRCA2 mutation. pancreatic imaging with contrast-enhanced MRI/magnetic resonance cholangiopancreatography (MRCP) and/or endoscopic ultrasound (EUS) every year starting at age 50, or 10 years before the earliest known pancreatic cancer in the family (whichever comes first). The lifetime risk for melanoma is not clear at this time. Therefore, there are no formal screening recommendations for the melanoma risk management. General population management including annual full-body dermatology exams is encouraged for melanoma screening, and minimizing UV exposure is recommended.      He reported there is no family history of pancreatic cancer. Given there is no family history of pancreatic cancer, it is not recommended for him to undergo pancreatic cancer  screening at this time. He is recommended to continue follow-up with dermatology     There is a very rare condition known as Fanconi anemia that can be a risk for the children of parents who both carry a BRCA2 mutation. This condition occurs when a child inherits two BRCA2 mutations, one from each parent. If both parents carry a BRCA2 mutation, there is a 25% chance for each child to inherit both BRCA2 mutations.      Implications for Relatives  Because a BRCA2 mutation was identified in him, his relatives are also at risk to share this mutation. There is a 50% chance for each of his future children to have inherited this mutation. We do not recommend testing children (before age 18) for this mutation, because the cancer risks and management recommendations are not relevant until adulthood. His siblings each have a 50% chance to carry this mutation. There is also a chance that other relatives could carry this mutation. Genetic testing for this mutation is recommended for her relatives, as testing would clarify if they have increased cancer risks that warrant additional cancer screening. We are happy to meet with relatives for genetic counseling and testing, or they can locate a genetic counselor near them at [email or web address].      Assessment/Plan  Continue following with dermatology for skin exams, as recommended.  Begin self chest exams and clinical breast exams at age 35.  Begin PSA and rectal exam starting at age 40  Consider beginning mammogram screening at age 50.   Begin and continue cancer screenings (i.e. colonoscopy, etc.) as recommended by his physicians.  Share genetic test results with his non-[facility] providers.     Questions were encouraged and answered to the patient's satisfaction, and he verbalized understanding of information and agreement with the plan.         Signed,    Josephine Sanford MS, [facility]  Certified Genetic Counselor, Hereditary and High-Risk Clinic  Hematology/Oncology, [facility]    01/17/2024

## 2024-05-16 ENCOUNTER — LAB VISIT (OUTPATIENT)
Dept: LAB | Facility: HOSPITAL | Age: 34
End: 2024-05-16
Attending: FAMILY MEDICINE
Payer: COMMERCIAL

## 2024-05-16 DIAGNOSIS — E78.01 FAMILIAL HYPERCHOLESTEROLEMIA: ICD-10-CM

## 2024-05-16 LAB
CHOLEST SERPL-MCNC: 180 MG/DL (ref 120–199)
CHOLEST/HDLC SERPL: 3.6 {RATIO} (ref 2–5)
HDLC SERPL-MCNC: 50 MG/DL (ref 40–75)
HDLC SERPL: 27.8 % (ref 20–50)
LDLC SERPL CALC-MCNC: 116 MG/DL (ref 63–159)
NONHDLC SERPL-MCNC: 130 MG/DL
TRIGL SERPL-MCNC: 70 MG/DL (ref 30–150)

## 2024-05-16 PROCEDURE — 36415 COLL VENOUS BLD VENIPUNCTURE: CPT | Mod: PN | Performed by: FAMILY MEDICINE

## 2024-05-16 PROCEDURE — 80061 LIPID PANEL: CPT | Performed by: FAMILY MEDICINE

## 2024-10-14 ENCOUNTER — OFFICE VISIT (OUTPATIENT)
Dept: PRIMARY CARE CLINIC | Facility: CLINIC | Age: 34
End: 2024-10-14
Payer: COMMERCIAL

## 2024-10-14 ENCOUNTER — E-CONSULT (OUTPATIENT)
Dept: GASTROENTEROLOGY | Facility: CLINIC | Age: 34
End: 2024-10-14
Payer: COMMERCIAL

## 2024-10-14 ENCOUNTER — LAB VISIT (OUTPATIENT)
Dept: LAB | Facility: HOSPITAL | Age: 34
End: 2024-10-14
Attending: FAMILY MEDICINE
Payer: COMMERCIAL

## 2024-10-14 ENCOUNTER — PATIENT MESSAGE (OUTPATIENT)
Dept: PRIMARY CARE CLINIC | Facility: CLINIC | Age: 34
End: 2024-10-14

## 2024-10-14 VITALS
DIASTOLIC BLOOD PRESSURE: 80 MMHG | HEIGHT: 66 IN | WEIGHT: 177.69 LBS | BODY MASS INDEX: 28.56 KG/M2 | HEART RATE: 53 BPM | SYSTOLIC BLOOD PRESSURE: 110 MMHG

## 2024-10-14 DIAGNOSIS — E78.01 FAMILIAL HYPERCHOLESTEROLEMIA: ICD-10-CM

## 2024-10-14 DIAGNOSIS — Z15.09 BRCA GENE MUTATION POSITIVE: Primary | ICD-10-CM

## 2024-10-14 DIAGNOSIS — Z15.01 BRCA GENE MUTATION POSITIVE: ICD-10-CM

## 2024-10-14 DIAGNOSIS — Z13.1 ENCOUNTER FOR SCREENING FOR DIABETES MELLITUS: ICD-10-CM

## 2024-10-14 DIAGNOSIS — Z12.5 ENCOUNTER FOR SCREENING FOR MALIGNANT NEOPLASM OF PROSTATE: ICD-10-CM

## 2024-10-14 DIAGNOSIS — Z12.11 COLON CANCER SCREENING: ICD-10-CM

## 2024-10-14 DIAGNOSIS — Z15.09 BRCA GENE MUTATION POSITIVE: ICD-10-CM

## 2024-10-14 DIAGNOSIS — L40.9 PSORIASIS: ICD-10-CM

## 2024-10-14 DIAGNOSIS — Z15.01 BRCA GENE MUTATION POSITIVE: Primary | ICD-10-CM

## 2024-10-14 DIAGNOSIS — Z13.29 THYROID DISORDER SCREENING: ICD-10-CM

## 2024-10-14 DIAGNOSIS — Z00.00 ANNUAL PHYSICAL EXAM: Primary | ICD-10-CM

## 2024-10-14 LAB
ALBUMIN SERPL BCP-MCNC: 4.2 G/DL (ref 3.5–5.2)
ALP SERPL-CCNC: 48 U/L (ref 55–135)
ALT SERPL W/O P-5'-P-CCNC: 40 U/L (ref 10–44)
ANION GAP SERPL CALC-SCNC: 8 MMOL/L (ref 8–16)
AST SERPL-CCNC: 25 U/L (ref 10–40)
BASOPHILS # BLD AUTO: 0.08 K/UL (ref 0–0.2)
BASOPHILS NFR BLD: 1.2 % (ref 0–1.9)
BILIRUB SERPL-MCNC: 0.8 MG/DL (ref 0.1–1)
BUN SERPL-MCNC: 11 MG/DL (ref 6–20)
CALCIUM SERPL-MCNC: 9.7 MG/DL (ref 8.7–10.5)
CHLORIDE SERPL-SCNC: 105 MMOL/L (ref 95–110)
CHOLEST SERPL-MCNC: 162 MG/DL (ref 120–199)
CHOLEST/HDLC SERPL: 3.2 {RATIO} (ref 2–5)
CO2 SERPL-SCNC: 26 MMOL/L (ref 23–29)
CREAT SERPL-MCNC: 1.2 MG/DL (ref 0.5–1.4)
DIFFERENTIAL METHOD BLD: ABNORMAL
EOSINOPHIL # BLD AUTO: 0.5 K/UL (ref 0–0.5)
EOSINOPHIL NFR BLD: 6.9 % (ref 0–8)
ERYTHROCYTE [DISTWIDTH] IN BLOOD BY AUTOMATED COUNT: 12 % (ref 11.5–14.5)
EST. GFR  (NO RACE VARIABLE): >60 ML/MIN/1.73 M^2
ESTIMATED AVG GLUCOSE: 97 MG/DL (ref 68–131)
GLUCOSE SERPL-MCNC: 89 MG/DL (ref 70–110)
HBA1C MFR BLD: 5 % (ref 4–5.6)
HCT VFR BLD AUTO: 46.6 % (ref 40–54)
HDLC SERPL-MCNC: 50 MG/DL (ref 40–75)
HDLC SERPL: 30.9 % (ref 20–50)
HGB BLD-MCNC: 16 G/DL (ref 14–18)
IMM GRANULOCYTES # BLD AUTO: 0.05 K/UL (ref 0–0.04)
IMM GRANULOCYTES NFR BLD AUTO: 0.7 % (ref 0–0.5)
LDLC SERPL CALC-MCNC: 93.8 MG/DL (ref 63–159)
LYMPHOCYTES # BLD AUTO: 2.3 K/UL (ref 1–4.8)
LYMPHOCYTES NFR BLD: 35 % (ref 18–48)
MCH RBC QN AUTO: 29.9 PG (ref 27–31)
MCHC RBC AUTO-ENTMCNC: 34.3 G/DL (ref 32–36)
MCV RBC AUTO: 87 FL (ref 82–98)
MONOCYTES # BLD AUTO: 0.6 K/UL (ref 0.3–1)
MONOCYTES NFR BLD: 9 % (ref 4–15)
NEUTROPHILS # BLD AUTO: 3.2 K/UL (ref 1.8–7.7)
NEUTROPHILS NFR BLD: 47.2 % (ref 38–73)
NONHDLC SERPL-MCNC: 112 MG/DL
NRBC BLD-RTO: 0 /100 WBC
PLATELET # BLD AUTO: 288 K/UL (ref 150–450)
PMV BLD AUTO: 9.7 FL (ref 9.2–12.9)
POTASSIUM SERPL-SCNC: 4.1 MMOL/L (ref 3.5–5.1)
PROT SERPL-MCNC: 7 G/DL (ref 6–8.4)
RBC # BLD AUTO: 5.35 M/UL (ref 4.6–6.2)
SODIUM SERPL-SCNC: 139 MMOL/L (ref 136–145)
TRIGL SERPL-MCNC: 91 MG/DL (ref 30–150)
TSH SERPL DL<=0.005 MIU/L-ACNC: 0.94 UIU/ML (ref 0.4–4)
WBC # BLD AUTO: 6.68 K/UL (ref 3.9–12.7)

## 2024-10-14 PROCEDURE — 85025 COMPLETE CBC W/AUTO DIFF WBC: CPT | Performed by: FAMILY MEDICINE

## 2024-10-14 PROCEDURE — 80061 LIPID PANEL: CPT | Performed by: FAMILY MEDICINE

## 2024-10-14 PROCEDURE — 3008F BODY MASS INDEX DOCD: CPT | Mod: CPTII,S$GLB,, | Performed by: FAMILY MEDICINE

## 2024-10-14 PROCEDURE — 99395 PREV VISIT EST AGE 18-39: CPT | Mod: S$GLB,,, | Performed by: FAMILY MEDICINE

## 2024-10-14 PROCEDURE — 99999 PR PBB SHADOW E&M-EST. PATIENT-LVL III: CPT | Mod: PBBFAC,,, | Performed by: FAMILY MEDICINE

## 2024-10-14 PROCEDURE — 83036 HEMOGLOBIN GLYCOSYLATED A1C: CPT | Performed by: FAMILY MEDICINE

## 2024-10-14 PROCEDURE — 84443 ASSAY THYROID STIM HORMONE: CPT | Performed by: FAMILY MEDICINE

## 2024-10-14 PROCEDURE — 84153 ASSAY OF PSA TOTAL: CPT | Performed by: FAMILY MEDICINE

## 2024-10-14 PROCEDURE — 80053 COMPREHEN METABOLIC PANEL: CPT | Performed by: FAMILY MEDICINE

## 2024-10-14 PROCEDURE — 1159F MED LIST DOCD IN RCRD: CPT | Mod: CPTII,S$GLB,, | Performed by: FAMILY MEDICINE

## 2024-10-14 PROCEDURE — 3074F SYST BP LT 130 MM HG: CPT | Mod: CPTII,S$GLB,, | Performed by: FAMILY MEDICINE

## 2024-10-14 PROCEDURE — 3079F DIAST BP 80-89 MM HG: CPT | Mod: CPTII,S$GLB,, | Performed by: FAMILY MEDICINE

## 2024-10-14 PROCEDURE — 36415 COLL VENOUS BLD VENIPUNCTURE: CPT | Mod: PN | Performed by: FAMILY MEDICINE

## 2024-10-14 PROCEDURE — 99446 NTRPROF PH1/NTRNET/EHR 5-10: CPT | Mod: S$GLB,,, | Performed by: INTERNAL MEDICINE

## 2024-10-14 RX ORDER — ROSUVASTATIN CALCIUM 5 MG/1
5 TABLET, COATED ORAL DAILY
Qty: 90 TABLET | Refills: 3 | Status: SHIPPED | OUTPATIENT
Start: 2024-10-14 | End: 2025-10-14

## 2024-10-14 NOTE — PROGRESS NOTES
"    [facility]       [address] [place] [address]      Phone: [phone]      Fax: [phone]    Benedicto Foster MD                Office Visit  10/14/2024        Subjective      HPI:  Cosme Thompson is a 34 y.o. male presents today in clinic for "Annual Exam  ."     34-year-old gentleman presents today for annual wellness exam.    Overall, feels okay.  No acute complaints.  No chest pain, shortness a breath.  No fever, chills, body aches.  No coughing, sneezing, URI type symptoms.  Appetite normal.  Bowel movements normal.  No urinary issues.      Has history of psoriasis on his hands.  Sees Dermatology regularly.  Still taking opzelura.  Seems to be keeping his symptoms under control.    Has HLD.  Last year we started him on Crestor 5 mg daily.  After six months, cholesterol levels improved significantly.  No issues with the medication.    States that a gene mutation runs in the family.  His maternal grandmother passed away suddenly from ovarian cancer.  As a result, his mom got tested for the BRCA gene, she was positive.  She had a prophylactic double mastectomy.  His sister was tested, as well, but she came back negative.  At previous visit, we referred him to Genetics Department for additional testing.  He states that he tested positive for BRCA mutation.  They recommended he start prostate and colon cancer screening early, but did not give him definite age/date recommendations.    PMH:  Psoriasis   PSH: None reported  F MH:  CAD/CABG.  HTN.  Ovarian cancer.  BRCA gene mutation.  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    T: Denies  A:  Socially/occasionally   D: Denies    Exercise rides exercise bike, runs, a couple times per week.      Dermatology:  Mikki Sutherland at [facility] (on [address])            The following were updated and reviewed by myself in the chart: medications, past " medical history, past surgical history, family history, social history, and allergies.     Medications:  Current Outpatient Medications on File Prior to Visit   Medication Sig Dispense Refill    OPZELURA 1.5 % Crea Apply topically.      [DISCONTINUED] rosuvastatin (CRESTOR) 5 MG tablet Take 1 tablet (5 mg total) by mouth once daily. 90 tablet 3     No current facility-administered medications on file prior to visit.        PMHx:  Past Medical History:   Diagnosis Date    BRCA2 gene mutation positive in male 12/13/2023    BRCA2 c.3599_3600del (p.[id number]*) - pathogenic    Genetic testing 12/13/2023    [facility] Multi Cancer Panel (70 genes)      There are no active problems to display for this patient.       PSHx:  History reviewed. No pertinent surgical history.     FHx:  Family History   Problem Relation Name Age of Onset    BRCA2 Positive Mother          BRCA2 c.[id number] (p.[id number]*)    Hypertension Father      BRCA2 Negative Sister      Ovarian cancer Maternal Grandmother      Prostate cancer Paternal Grandfather      Prostate cancer Other          Social:  Social History     Socioeconomic History    Marital status:    Tobacco Use    Smoking status: Never    Smokeless tobacco: Never        Allergies:  Review of patient's allergies indicates:   Allergen Reactions    Wasp venom Hives, Itching and Swelling        ROS:  Review of Systems   Constitutional:  Negative for activity change, appetite change, chills and fever.   HENT:  Negative for congestion, postnasal drip, rhinorrhea, sore throat and trouble swallowing.    Respiratory:  Negative for cough and shortness of breath.    Cardiovascular:  Negative for chest pain and palpitations.   Gastrointestinal:  Negative for abdominal pain, constipation, diarrhea, nausea and vomiting.   Genitourinary:  Negative for difficulty urinating.   Musculoskeletal:  Negative for arthralgias and myalgias.   Skin:  Negative for color change and rash.   Neurological:  Negative  "for headaches.   All other systems reviewed and are negative.         Objective      /80   Pulse (!) 53   Ht 5' 6" (1.676 m)   Wt 80.6 kg (177 lb 11.1 oz)   BMI 28.68 kg/m²   Ht Readings from Last 3 Encounters:   10/14/24 5' 6" (1.676 m)   10/17/23 5' 6" (1.676 m)   05/02/20 5' 6" (1.676 m)     Wt Readings from Last 3 Encounters:   10/14/24 80.6 kg (177 lb 11.1 oz)   10/17/23 77.7 kg (171 lb 4.8 oz)   05/02/20 70.3 kg (155 lb)       PHYSICAL EXAM:  Physical Exam  Vitals and nursing note reviewed.   Constitutional:       General: He is not in acute distress.     Appearance: Normal appearance.   HENT:      Head: Normocephalic and atraumatic.      Right Ear: Tympanic membrane, ear canal and external ear normal.      Left Ear: Tympanic membrane, ear canal and external ear normal.      Nose: Nose normal. No congestion or rhinorrhea.      Mouth/Throat:      Mouth: Mucous membranes are moist.      Pharynx: Oropharynx is clear. No oropharyngeal exudate or posterior oropharyngeal erythema.   Eyes:      Extraocular Movements: Extraocular movements intact.      Conjunctiva/sclera: Conjunctivae normal.      Pupils: Pupils are equal, round, and reactive to light.   Cardiovascular:      Rate and Rhythm: Normal rate and regular rhythm.   Pulmonary:      Effort: Pulmonary effort is normal.      Breath sounds: No wheezing, rhonchi or rales.   Musculoskeletal:         General: Normal range of motion.      Cervical back: Normal range of motion.   Lymphadenopathy:      Cervical: No cervical adenopathy.   Skin:     General: Skin is warm and dry.   Neurological:      General: No focal deficit present.      Mental Status: He is alert.              LABS / IMAGING:  Recent Results (from the past 26 weeks)   Lipid Panel    Collection Time: 05/16/24 10:06 AM   Result Value Ref Range    Cholesterol 180 120 - 199 mg/dL    Triglycerides 70 30 - 150 mg/dL    HDL 50 40 - 75 mg/dL    LDL Cholesterol 116.0 63.0 - 159.0 mg/dL    " HDL/Cholesterol Ratio 27.8 20.0 - 50.0 %    Total Cholesterol/HDL Ratio 3.6 2.0 - 5.0    Non-HDL Cholesterol 130 mg/dL         Assessment    1. Annual physical exam    2. Familial hypercholesterolemia    3. Psoriasis    4. BRCA gene mutation positive    5. Colon cancer screening    6. Encounter for screening for malignant neoplasm of prostate    7. Encounter for screening for diabetes mellitus    8. Thyroid disorder screening          Plan    Cosme was seen today for annual exam.    Diagnoses and all orders for this visit:    Annual physical exam    Familial hypercholesterolemia  -     rosuvastatin (CRESTOR) 5 MG tablet; Take 1 tablet (5 mg total) by mouth once daily.  -     CBC Auto Differential; Future  -     Comprehensive Metabolic Panel; Future  -     TSH; Future  -     Lipid Panel; Future  -     PSA, Screening; Future  -     Hemoglobin A1C; Future    Psoriasis  -     CBC Auto Differential; Future  -     Comprehensive Metabolic Panel; Future  -     TSH; Future  -     Lipid Panel; Future  -     PSA, Screening; Future  -     Hemoglobin A1C; Future    BRCA gene mutation positive  -     E-Consult to Gastroenterology  -     CBC Auto Differential; Future  -     Comprehensive Metabolic Panel; Future  -     TSH; Future  -     Lipid Panel; Future  -     PSA, Screening; Future  -     Hemoglobin A1C; Future    Colon cancer screening  -     E-Consult to Gastroenterology    Encounter for screening for malignant neoplasm of prostate  -     PSA, Screening; Future    Encounter for screening for diabetes mellitus  -     Hemoglobin A1C; Future    Thyroid disorder screening  -     TSH; Future      Physically, everything looks good.      Screening labs, as above.      Since Genetics recommended he start prostate cancer screening early, we can get a PSA level with the additional labs.    We will do eConsult with GI to see when they recommend he start colon cancer screening.    Continue Crestor.    FOLLOW-UP:  Follow up in about 1  year (around 10/14/2025) for annual exam.    I spent a total of 30 minutes face to face and non-face to face on the date of this visit.This includes time preparing to see the patient (eg, review of tests, notes), obtaining and/or reviewing additional history from an independent historian and/or outside medical records, documenting clinical information in the electronic health record, independently interpreting results and/or communicating results to the patient/family/caregiver, or care coordinator.  Visit today included increased complexity associated with the care of the episodic problem addressed and managing the longitudinal care of the patient due to the serious and/or complex managed problem(s).    Signed by:  Benedicto Foster MD

## 2024-10-14 NOTE — PATIENT INSTRUCTIONS
Physically, everything looks really good today.      Let us go ahead and get some screening blood work done today to check things on the inside.  Results will be posted to [facility] as soon as they are available.      We can also include a PSA level to screen for prostate cancer.      I am sending an eConsult to our Gastroenterology/colorectal Department today to get their recommendations on colon cancer screening, given that you are BRCA positive.  As soon as I hear back from them, I will let you know what they recommend.      In the meantime, continue taking your cholesterol medicine.  Definitely seems to be working!     Continue to eat a healthy diet.  Be careful with portion sizes.  Includes lots of fresh fruits, vegetables, whole grains, lean proteins.  See info below.    Keep hydrated.  Be sure to drink at least 8-10, 8 oz, glasses of water every day.    Stay active.  Try to do some sort of physical activity every day.  Nothing outrageous, just try walking for 10-15 minutes each day.

## 2024-10-14 NOTE — CONSULTS
[facility]  Response for E-Consult     Patient Name: Cosme Thompson  MRN: [id number]  Primary Care Provider: Benedicto Foster MD   Requesting Provider: Benedicto Foster MD  Consults    For a patient who is 34 years of age and BRCA positive there is insufficient evidence regards to colon cancer screening to recommend a different protocol then what is recommended for the general public.  I assume that there is no history in the family of colorectal cancer or advanced adenomas.  Recommendation:  Although general guidelines would say this patient should begin screening at age 45, I would suggest that we at least revisit the issue at age 40    Contingency that warrants a repeat eConsult or referral:  Anymore new information about family history, particularly about colon cancer or advanced adenomas in other family members    Total time of Consultation: 10 minute    I did not speak to the requesting provider verbally about this.     *This eConsult is based on the clinical data available to me and is furnished without benefit of a physical examination. The eConsult will need to be interpreted in light of any clinical issues or changes in patient status not available to me at the time of filing this eConsults. Significant changes in patient condition or level of acuity should result in immediate formal consultation and reevaluation. Please alert me if you have further questions.    Thank you for this eConsult referral.     Frank Colin MD  [facility]

## 2024-10-15 LAB — COMPLEXED PSA SERPL-MCNC: 0.42 NG/ML (ref 0–4)

## 2024-10-16 NOTE — PROGRESS NOTES
Labs all look great!     Blood counts all look fine.  Electrolytes, kidney function, liver function, and thyroid function are okay.  Random glucose, A1c are in the normal range, so no signs of diabetes.  Cholesterol levels are perfect.  PSA levels are also normal.

## 2024-10-19 ENCOUNTER — PATIENT MESSAGE (OUTPATIENT)
Dept: PRIMARY CARE CLINIC | Facility: CLINIC | Age: 34
End: 2024-10-19
Payer: COMMERCIAL

## 2024-10-19 DIAGNOSIS — T63.461A ALLERGIC REACTION TO WASP STING: Primary | ICD-10-CM

## 2024-10-21 RX ORDER — EPINEPHRINE 0.3 MG/.3ML
2 INJECTION SUBCUTANEOUS ONCE
Qty: 0.6 ML | Refills: 0 | Status: SHIPPED | OUTPATIENT
Start: 2024-10-21 | End: 2024-10-21

## 2024-11-05 ENCOUNTER — PATIENT MESSAGE (OUTPATIENT)
Dept: RESEARCH | Facility: HOSPITAL | Age: 34
End: 2024-11-05
Payer: COMMERCIAL